# Patient Record
Sex: FEMALE | Race: BLACK OR AFRICAN AMERICAN | Employment: FULL TIME | URBAN - METROPOLITAN AREA
[De-identification: names, ages, dates, MRNs, and addresses within clinical notes are randomized per-mention and may not be internally consistent; named-entity substitution may affect disease eponyms.]

---

## 2018-05-19 LAB
ANTIBODY SCREEN, EXTERNAL: NEGATIVE
HBSAG, EXTERNAL: NEGATIVE
HIV, EXTERNAL: NON REACTIVE
RUBELLA, EXTERNAL: NORMAL
T. PALLIDUM, EXTERNAL: NEGATIVE
TYPE, ABO & RH, EXTERNAL: NORMAL

## 2018-11-27 LAB — GRBS, EXTERNAL: NEGATIVE

## 2018-12-30 ENCOUNTER — HOSPITAL ENCOUNTER (INPATIENT)
Age: 38
LOS: 2 days | Discharge: HOME | End: 2019-01-01
Attending: OBSTETRICS & GYNECOLOGY | Admitting: OBSTETRICS & GYNECOLOGY
Payer: COMMERCIAL

## 2018-12-30 ENCOUNTER — ANESTHESIA (OUTPATIENT)
Dept: LABOR AND DELIVERY | Age: 38
End: 2018-12-30
Payer: COMMERCIAL

## 2018-12-30 ENCOUNTER — ANESTHESIA EVENT (OUTPATIENT)
Dept: LABOR AND DELIVERY | Age: 38
End: 2018-12-30
Payer: COMMERCIAL

## 2018-12-30 PROBLEM — O42.90 AMNIOTIC FLUID LEAKING: Status: ACTIVE | Noted: 2018-12-30

## 2018-12-30 LAB
ERYTHROCYTE [DISTWIDTH] IN BLOOD BY AUTOMATED COUNT: 13.6 % (ref 11.5–14.5)
HCT VFR BLD AUTO: 36 % (ref 35–47)
HGB BLD-MCNC: 11.5 G/DL (ref 11.5–16)
MCH RBC QN AUTO: 26 PG (ref 26–34)
MCHC RBC AUTO-ENTMCNC: 31.9 G/DL (ref 30–36.5)
MCV RBC AUTO: 81.4 FL (ref 80–99)
NRBC # BLD: 0 K/UL (ref 0–0.01)
NRBC BLD-RTO: 0 PER 100 WBC
PLATELET # BLD AUTO: 252 K/UL (ref 150–400)
PMV BLD AUTO: 11.4 FL (ref 8.9–12.9)
RBC # BLD AUTO: 4.42 M/UL (ref 3.8–5.2)
WBC # BLD AUTO: 8.9 K/UL (ref 3.6–11)

## 2018-12-30 PROCEDURE — 74011250637 HC RX REV CODE- 250/637: Performed by: ADVANCED PRACTICE MIDWIFE

## 2018-12-30 PROCEDURE — 36415 COLL VENOUS BLD VENIPUNCTURE: CPT

## 2018-12-30 PROCEDURE — 77030014125 HC TY EPDRL BBMI -B: Performed by: ANESTHESIOLOGY

## 2018-12-30 PROCEDURE — 77030011943

## 2018-12-30 PROCEDURE — 85027 COMPLETE CBC AUTOMATED: CPT

## 2018-12-30 PROCEDURE — 0KQM0ZZ REPAIR PERINEUM MUSCLE, OPEN APPROACH: ICD-10-PCS | Performed by: ADVANCED PRACTICE MIDWIFE

## 2018-12-30 PROCEDURE — 75410000000 HC DELIVERY VAGINAL/SINGLE

## 2018-12-30 PROCEDURE — 94760 N-INVAS EAR/PLS OXIMETRY 1: CPT

## 2018-12-30 PROCEDURE — 74011250636 HC RX REV CODE- 250/636: Performed by: ANESTHESIOLOGY

## 2018-12-30 PROCEDURE — A4300 CATH IMPL VASC ACCESS PORTAL: HCPCS

## 2018-12-30 PROCEDURE — 74011250637 HC RX REV CODE- 250/637: Performed by: OBSTETRICS & GYNECOLOGY

## 2018-12-30 PROCEDURE — 74011250636 HC RX REV CODE- 250/636: Performed by: ADVANCED PRACTICE MIDWIFE

## 2018-12-30 PROCEDURE — 76060000078 HC EPIDURAL ANESTHESIA

## 2018-12-30 PROCEDURE — 65410000002 HC RM PRIVATE OB

## 2018-12-30 PROCEDURE — 75410000003 HC RECOV DEL/VAG/CSECN EA 0.5 HR

## 2018-12-30 PROCEDURE — 75410000002 HC LABOR FEE PER 1 HR

## 2018-12-30 PROCEDURE — 74011000250 HC RX REV CODE- 250

## 2018-12-30 RX ORDER — SIMETHICONE 80 MG
80 TABLET,CHEWABLE ORAL
Status: DISCONTINUED | OUTPATIENT
Start: 2018-12-30 | End: 2019-01-01 | Stop reason: HOSPADM

## 2018-12-30 RX ORDER — FENTANYL CITRATE 50 UG/ML
100 INJECTION, SOLUTION INTRAMUSCULAR; INTRAVENOUS ONCE
Status: DISCONTINUED | OUTPATIENT
Start: 2018-12-30 | End: 2018-12-30 | Stop reason: HOSPADM

## 2018-12-30 RX ORDER — OXYTOCIN/0.9 % SODIUM CHLORIDE 30/500 ML
0-25 PLASTIC BAG, INJECTION (ML) INTRAVENOUS
Status: DISCONTINUED | OUTPATIENT
Start: 2018-12-30 | End: 2018-12-30 | Stop reason: HOSPADM

## 2018-12-30 RX ORDER — FENTANYL CITRATE 50 UG/ML
INJECTION, SOLUTION INTRAMUSCULAR; INTRAVENOUS AS NEEDED
Status: DISCONTINUED | OUTPATIENT
Start: 2018-12-30 | End: 2018-12-30 | Stop reason: HOSPADM

## 2018-12-30 RX ORDER — SODIUM CHLORIDE 0.9 % (FLUSH) 0.9 %
SYRINGE (ML) INJECTION
Status: DISPENSED
Start: 2018-12-30 | End: 2018-12-30

## 2018-12-30 RX ORDER — SODIUM CHLORIDE 0.9 % (FLUSH) 0.9 %
SYRINGE (ML) INJECTION
Status: DISPENSED
Start: 2018-12-30 | End: 2018-12-31

## 2018-12-30 RX ORDER — HYDROCORTISONE ACETATE PRAMOXINE HCL 2.5; 1 G/100G; G/100G
CREAM TOPICAL AS NEEDED
Status: DISCONTINUED | OUTPATIENT
Start: 2018-12-30 | End: 2019-01-01 | Stop reason: HOSPADM

## 2018-12-30 RX ORDER — HYDROCODONE BITARTRATE AND ACETAMINOPHEN 5; 325 MG/1; MG/1
1 TABLET ORAL
Status: DISCONTINUED | OUTPATIENT
Start: 2018-12-30 | End: 2019-01-01 | Stop reason: HOSPADM

## 2018-12-30 RX ORDER — BUPIVACAINE HYDROCHLORIDE 5 MG/ML
30 INJECTION, SOLUTION EPIDURAL; INTRACAUDAL ONCE
Status: DISCONTINUED | OUTPATIENT
Start: 2018-12-30 | End: 2018-12-30 | Stop reason: HOSPADM

## 2018-12-30 RX ORDER — CALCIUM CARBONATE 200(500)MG
1 TABLET,CHEWABLE ORAL DAILY
COMMUNITY

## 2018-12-30 RX ORDER — MAG HYDROX/ALUMINUM HYD/SIMETH 200-200-20
30 SUSPENSION, ORAL (FINAL DOSE FORM) ORAL
Status: DISCONTINUED | OUTPATIENT
Start: 2018-12-30 | End: 2019-01-01 | Stop reason: HOSPADM

## 2018-12-30 RX ORDER — NALBUPHINE HYDROCHLORIDE 10 MG/ML
10 INJECTION, SOLUTION INTRAMUSCULAR; INTRAVENOUS; SUBCUTANEOUS
Status: DISCONTINUED | OUTPATIENT
Start: 2018-12-30 | End: 2018-12-30 | Stop reason: HOSPADM

## 2018-12-30 RX ORDER — NALOXONE HYDROCHLORIDE 0.4 MG/ML
0.4 INJECTION, SOLUTION INTRAMUSCULAR; INTRAVENOUS; SUBCUTANEOUS AS NEEDED
Status: DISCONTINUED | OUTPATIENT
Start: 2018-12-30 | End: 2018-12-30 | Stop reason: HOSPADM

## 2018-12-30 RX ORDER — NALBUPHINE HYDROCHLORIDE 10 MG/ML
2.5 INJECTION, SOLUTION INTRAMUSCULAR; INTRAVENOUS; SUBCUTANEOUS
Status: DISCONTINUED | OUTPATIENT
Start: 2018-12-30 | End: 2018-12-30 | Stop reason: HOSPADM

## 2018-12-30 RX ORDER — LIDOCAINE HYDROCHLORIDE AND EPINEPHRINE 15; 5 MG/ML; UG/ML
INJECTION, SOLUTION EPIDURAL AS NEEDED
Status: DISCONTINUED | OUTPATIENT
Start: 2018-12-30 | End: 2018-12-30 | Stop reason: HOSPADM

## 2018-12-30 RX ORDER — CALCIUM CARBONATE 200(500)MG
200 TABLET,CHEWABLE ORAL DAILY
Status: DISCONTINUED | OUTPATIENT
Start: 2018-12-31 | End: 2019-01-01 | Stop reason: HOSPADM

## 2018-12-30 RX ORDER — FENTANYL/BUPIVACAINE/NS/PF 2-1250MCG
1-16 PREFILLED PUMP RESERVOIR EPIDURAL CONTINUOUS
Status: DISCONTINUED | OUTPATIENT
Start: 2018-12-30 | End: 2019-01-01 | Stop reason: HOSPADM

## 2018-12-30 RX ORDER — FAMOTIDINE 10 MG/1
10 TABLET ORAL 2 TIMES DAILY
COMMUNITY

## 2018-12-30 RX ORDER — TERBUTALINE SULFATE 1 MG/ML
0.25 INJECTION SUBCUTANEOUS AS NEEDED
Status: DISCONTINUED | OUTPATIENT
Start: 2018-12-30 | End: 2018-12-30 | Stop reason: HOSPADM

## 2018-12-30 RX ORDER — IBUPROFEN 400 MG/1
800 TABLET ORAL EVERY 8 HOURS
Status: DISCONTINUED | OUTPATIENT
Start: 2018-12-30 | End: 2019-01-01 | Stop reason: HOSPADM

## 2018-12-30 RX ORDER — OXYTOCIN/RINGER'S LACTATE 20/1000 ML
125-500 PLASTIC BAG, INJECTION (ML) INTRAVENOUS ONCE
Status: ACTIVE | OUTPATIENT
Start: 2018-12-30 | End: 2018-12-31

## 2018-12-30 RX ORDER — FAMOTIDINE 20 MG/1
10 TABLET, FILM COATED ORAL 2 TIMES DAILY
Status: DISCONTINUED | OUTPATIENT
Start: 2018-12-30 | End: 2019-01-01 | Stop reason: HOSPADM

## 2018-12-30 RX ORDER — SODIUM CHLORIDE 0.9 % (FLUSH) 0.9 %
SYRINGE (ML) INJECTION
Status: COMPLETED
Start: 2018-12-30 | End: 2018-12-30

## 2018-12-30 RX ORDER — SWAB
1 SWAB, NON-MEDICATED MISCELLANEOUS DAILY
Status: DISCONTINUED | OUTPATIENT
Start: 2018-12-31 | End: 2019-01-01 | Stop reason: HOSPADM

## 2018-12-30 RX ORDER — BUPIVACAINE HYDROCHLORIDE 5 MG/ML
INJECTION, SOLUTION EPIDURAL; INTRACAUDAL AS NEEDED
Status: DISCONTINUED | OUTPATIENT
Start: 2018-12-30 | End: 2018-12-30 | Stop reason: HOSPADM

## 2018-12-30 RX ORDER — NALOXONE HYDROCHLORIDE 0.4 MG/ML
0.4 INJECTION, SOLUTION INTRAMUSCULAR; INTRAVENOUS; SUBCUTANEOUS AS NEEDED
Status: DISCONTINUED | OUTPATIENT
Start: 2018-12-30 | End: 2019-01-01 | Stop reason: HOSPADM

## 2018-12-30 RX ORDER — EPHEDRINE SULFATE 50 MG/ML
10 INJECTION, SOLUTION INTRAVENOUS
Status: DISCONTINUED | OUTPATIENT
Start: 2018-12-30 | End: 2018-12-30 | Stop reason: HOSPADM

## 2018-12-30 RX ORDER — ACETAMINOPHEN 325 MG/1
650 TABLET ORAL
Status: DISCONTINUED | OUTPATIENT
Start: 2018-12-30 | End: 2019-01-01 | Stop reason: HOSPADM

## 2018-12-30 RX ORDER — FENTANYL CITRATE 50 UG/ML
100 INJECTION, SOLUTION INTRAMUSCULAR; INTRAVENOUS
Status: DISCONTINUED | OUTPATIENT
Start: 2018-12-30 | End: 2018-12-30 | Stop reason: HOSPADM

## 2018-12-30 RX ORDER — SODIUM CHLORIDE, SODIUM LACTATE, POTASSIUM CHLORIDE, CALCIUM CHLORIDE 600; 310; 30; 20 MG/100ML; MG/100ML; MG/100ML; MG/100ML
125 INJECTION, SOLUTION INTRAVENOUS CONTINUOUS
Status: DISCONTINUED | OUTPATIENT
Start: 2018-12-30 | End: 2019-01-01 | Stop reason: HOSPADM

## 2018-12-30 RX ADMIN — FENTANYL CITRATE 100 MCG: 50 INJECTION, SOLUTION INTRAMUSCULAR; INTRAVENOUS at 09:06

## 2018-12-30 RX ADMIN — IBUPROFEN 800 MG: 400 TABLET, FILM COATED ORAL at 19:33

## 2018-12-30 RX ADMIN — OXYTOCIN-SODIUM CHLORIDE 0.9% IV SOLN 30 UNIT/500ML 4 MILLI-UNITS/MIN: 30-0.9/5 SOLUTION at 11:47

## 2018-12-30 RX ADMIN — BUPIVACAINE HYDROCHLORIDE 2 ML: 5 INJECTION, SOLUTION EPIDURAL; INTRACAUDAL at 09:05

## 2018-12-30 RX ADMIN — LIDOCAINE HYDROCHLORIDE AND EPINEPHRINE 5 ML: 15; 5 INJECTION, SOLUTION EPIDURAL at 09:03

## 2018-12-30 RX ADMIN — Medication 10 ML: at 08:34

## 2018-12-30 RX ADMIN — Medication 10 ML/HR: at 09:11

## 2018-12-30 RX ADMIN — SODIUM CHLORIDE, SODIUM LACTATE, POTASSIUM CHLORIDE, AND CALCIUM CHLORIDE 1000 ML: 600; 310; 30; 20 INJECTION, SOLUTION INTRAVENOUS at 08:37

## 2018-12-30 RX ADMIN — OXYTOCIN-SODIUM CHLORIDE 0.9% IV SOLN 30 UNIT/500ML 2 MILLI-UNITS/MIN: 30-0.9/5 SOLUTION at 10:57

## 2018-12-30 RX ADMIN — BUPIVACAINE HYDROCHLORIDE 3 ML: 5 INJECTION, SOLUTION EPIDURAL; INTRACAUDAL at 09:07

## 2018-12-30 RX ADMIN — ALUMINUM HYDROXIDE, MAGNESIUM HYDROXIDE, AND SIMETHICONE 30 ML: 200; 200; 20 SUSPENSION ORAL at 16:19

## 2018-12-30 NOTE — H&P
Labor and Delivery Admission Note 2018 Patient is a 41 y.o.  40w4d by LMP. Pt of Dr. Nguyen Arango. Presents today with c/o SROM at [address], clear fluid. Feeling UCs but mildly. Plans on an epidural when pain increases. Denies sig med/ob hx. Uncomplicated prenatal course. FOB at bedside. No complaints. PNC: Blood type: O 
          RH: pos Hep B: Negative Rubella: immune GBS status: Negative OBHX:  
OB History  Para Term  AB Living 2       1 0  
 SAB TAB Ectopic Molar Multiple Live Births PMH:  
Past Medical History:  
Diagnosis Date  Abnormal Papanicolaou smear of cervix --abnormal, colposcopy PSH: No past surgical history on file. OB/GYN: Mariely Mcnulty Meds:  
Prior to Admission Medications Prescriptions Last Dose Informant Patient Reported? Taking? PNV66-Iron Fumarate-FA-DSS-DHA 26-1.2- mg cap 2018 at Unknown time  Yes Yes Sig: Take  by mouth.  
calcium carbonate (TUMS) 200 mg calcium (500 mg) chew   Yes Yes Sig: Take 1 Tab by mouth daily. famotidine (PEPCID AC) 10 mg tablet 2018 at Unknown time  Yes Yes Sig: Take 10 mg by mouth two (2) times a day. Facility-Administered Medications: None Allergies: Allergies Allergen Reactions  Bactrim [Sulfamethoprim] Shortness of Breath  Sulfa (Sulfonamide Antibiotics) Shortness of Breath Pertinent ROS: Review of Systems - History obtained from chart review and the patient General ROS: negative Psychological ROS: negative Respiratory ROS: no cough, shortness of breath, or wheezing Cardiovascular ROS: no chest pain or dyspnea on exertion Genito-Urinary ROS: no dysuria, trouble voiding, or hematuria Neurological ROS: no TIA or stroke symptoms [address]: No family history on file. SH: Social History Socioeconomic History  Marital status:  Spouse name: Not on file  Number of children: Not on file  Years of education: Not on file  Highest education level: Not on file Social Needs  Financial resource strain: Not on file  Food insecurity - worry: Not on file  Food insecurity - inability: Not on file  Transportation needs - medical: Not on file  Transportation needs - non-medical: Not on file Occupational History  Not on file Tobacco Use  Smoking status: Never Smoker  Smokeless tobacco: Never Used Substance and Sexual Activity  Alcohol use: No  
  Frequency: Never  Drug use: No  
 Sexual activity: Yes  
  Partners: Male Other Topics Concern [address] Service Not Asked  Blood Transfusions Not Asked  Caffeine Concern Not Asked  Occupational Exposure Not Asked [employer] Hazards Not Asked  Sleep Concern Not Asked  Stress Concern Not Asked  Weight Concern Not Asked  Special Diet Not Asked  Back Care Not Asked  Exercise Not Asked  Bike Helmet Not Asked  Seat Belt Not Asked  Self-Exams Not Asked Social History Narrative  Not on file OBJECTIVE: 
 
Temp (24hrs), Av.2 °F (36.8 °C), Min:98.2 °F (36.8 °C), Max:98.2 °F (36.8 °C) Visit Vitals /87 Pulse 72 Temp 98.2 °F (36.8 °C) Resp 18 Ht 5' 8\" (1.727 m) Wt 86.5 kg (190 lb 9.6 oz) Breastfeeding? No  
BMI 28.98 kg/m² FHR baseline 130 moderate variaibility, +accels, no decels [place] 4-6 min, mild Exam: 
General: alert HEENT:  normal  
Lungs:  clear Cor:  RRR Abdomen:  Soft, non-tender Clinical EFW 3400 gms Cervix:  3/50/-2 Fluid:  clear Pelvimetry:  AP-good Arch- adequate Sidewalls- adequate Pelvis feels adequate for fetus. Extremities:  normal, no edema Impression:  
 at 40w4d Reassuring FWB 
SROM x3 hrs, afebrile Plan:  
Admit Routine labs, orders Expectant management, plan for pitocin at 12pm if needed Omar Arellano CNM 
3:31 AM

## 2018-12-30 NOTE — ANESTHESIA PROCEDURE NOTES
Epidural Block Performed by: Charleen Underwood DO Authorized by: Charleen Underwood DO  
 
Pre-Procedure Indication: at surgeon's request and labor epidural   
Preanesthetic Checklist: patient identified, risks and benefits discussed, anesthesia consent, site marked, patient being monitored, timeout performed and anesthesia consent Epidural:  
Patient position:  Seated Prep region:  Lumbar Prep: Chlorhexidine Location:  L3-4 Needle and Epidural Catheter:  
Needle Type:  Tuohy Needle Gauge:  17 G Injection Technique:  Loss of resistance using air Attempts:  1 Catheter Size:  19 G Events: no blood with aspiration, no cerebrospinal fluid with aspiration, no paresthesia and negative aspiration test   
Test Dose:  Bupivacaine 0.25% and negative Assessment:  
Catheter Secured:  Tegaderm and tape Insertion:  Uncomplicated Patient tolerance:  Patient tolerated the procedure well with no immediate complications

## 2018-12-30 NOTE — PROGRESS NOTES
[facility] Labor Progress Note Patient: Randi Siegel MRN: [id number]  SSN: [id number] YOB: 1980  Age: 41 y.o. Sex: female Subjective:  
Patient coping well with contractions. Continues to be comfortable with epidural. Reports consistent/strong pressure. Objective:  
Blood pressure 104/58, pulse 75, temperature 98.8 °F (37.1 °C), resp. rate 18, height 5' 8\" (1.727 m), weight 86.5 kg (190 lb 9.6 oz), SpO2 97 %, not currently breastfeeding. Fetal heart baseline 130, moderate variability, positive accelerations, negative decelerations, Uterine contractions q 2 minutes, strong to palpation, resting tone soft, Sterile Vaginal Exam: 10 cm dilated/ 100 % effaced/ +2 station, fetal presentation vertex, membranes ruptured for continued clear fluid. Pitocin at 16 mu/min Patient Vitals for the past 4 hrs: 
 Temp Pulse BP  
18 1505  75 104/58  
18 1405  86 113/65  
18 1253 98.8 °F (37.1 °C) 82 110/58 Assessment:  
 
40w4d Category 1 fetal heart rate tracing Labor- Starting second stage SROM x 16 hours, no s/s of infection Plan:  
Start pushing Anticipate  Herberth Mistry CNM

## 2018-12-30 NOTE — ANESTHESIA PREPROCEDURE EVALUATION
Anesthetic History No history of anesthetic complications Review of Systems / Medical History Patient summary reviewed, nursing notes reviewed and pertinent labs reviewed Pulmonary Within defined limits Neuro/Psych Within defined limits Cardiovascular Within defined limits Exercise tolerance: >4 METS 
  
GI/Hepatic/Renal 
Within defined limits Endo/Other Within defined limits Other Findings Comments: Labor pain Physical Exam 
 
Airway Mallampati: II 
TM Distance: 4 - 6 cm Neck ROM: normal range of motion Mouth opening: Normal 
 
 Cardiovascular Regular rate and rhythm,  S1 and S2 normal,  no murmur, click, rub, or gallop Rhythm: regular Rate: normal 
 
 
 
 Dental 
No notable dental hx Pulmonary Breath sounds clear to auscultation Abdominal 
GI exam deferred Other Findings Anesthetic Plan ASA: 2 Anesthesia type: epidural 
 
 
 
 
 
Anesthetic plan and risks discussed with: Patient and Spouse

## 2018-12-30 NOTE — PROGRESS NOTES
[facility] Labor Progress Note Patient: Jacob Nava MRN: [id number]  SSN: [id number] YOB: 1980  Age: 41 y.o. Sex: female Subjective:  
Patient coping well with contractions. Is very comfortable with epidural. Has been able to get some sleep. Objective:  
Blood pressure 115/69, pulse 91, temperature 98.6 °F (37 °C), resp. rate 18, height 5' 8\" (1.727 m), weight 86.5 kg (190 lb 9.6 oz), SpO2 97 %, not currently breastfeeding. Fetal heart baseline 120, moderate variability, positive accelerations, positive early decelerations, Uterine contractions q 6-8 minutes, moderate to palpation, resting tone soft, Sterile Vaginal Exam: 4 cm dilated/ 100 % effaced/ -3 station, cervix midline, fetal presentation vertex, membranes ruptured for continued clear fluid Patient Vitals for the past 4 hrs: 
 Temp Pulse Resp BP SpO2  
18 0931  91  115/69   
18 0926  82  119/68   
18 0921  91  111/59   
18 0917     97 % 18 0915  84  102/55   
18 0912     98 % 18 0910  85  104/59   
18 0904  93  130/82   
18 0901  81  142/88   
18 0900    132/81   
18 0850  74  (!) 142/91   
18 0833  77  127/84   
18 0743 98.6 °F (37 °C) 73 18 132/82  Assessment:  
 
40w4d Category 1 fetal heart rate tracing Early Labor- Some progress noted SROM x 10 hours Plan:  
Discussed cervical change and spaced contraction pattern. Discussed augmentation with pitocin at this time to facilitate cervical change. Client verbalized understanding and agreement to augment at this time. Order to pitocin placed. Titrate to adequacy. Maternal and fetal monitoring per protocol. Anticipate  Latisha Matias CNM

## 2018-12-30 NOTE — PROGRESS NOTES
[id number] Report received from [place] [id number] off the monitor encouraged to get up and ambulate 0740 Bedside shift change report given to LARON Scott Rn (oncoming nurse) by Venus Sheridan (offgoing nurse). Report included the following information SBAR, MAR and Med Rec Status.

## 2018-12-30 NOTE — PROGRESS NOTES
[id number] called sue to come for epidural  
 
[address] [facility] [address]. Damir Velazquez in for epidural

## 2018-12-30 NOTE — ANESTHESIA POSTPROCEDURE EVALUATION
Labor epidural. 
 
Anesthesia Post Evaluation Multimodal analgesia: multimodal analgesia used between 6 hours prior to anesthesia start to PACU discharge Patient location during evaluation: bedside Patient participation: complete - patient participated Level of consciousness: awake and alert Pain management: adequate Airway patency: patent Anesthetic complications: no 
Cardiovascular status: acceptable Respiratory status: acceptable Hydration status: acceptable Post anesthesia nausea and vomiting:  none Visit Vitals /67 (BP 1 Location: Left arm) Pulse 97 Temp 37.1 °C (98.8 °F) Resp 18 Ht 5' 8\" (1.727 m) Wt 86.5 kg (190 lb 9.6 oz) SpO2 97% Breastfeeding? Unknown BMI 28.98 kg/m²

## 2018-12-30 NOTE — PROGRESS NOTES
[facility] Labor Progress Note Patient: Miles Guerrero MRN: [id number]  SSN: [id number] YOB: 1980  Age: 41 y.o. Sex: female Subjective:  
Patient standing at side of bed, tearful, states contractions are much more intense. Reports the pain is more in abdomen, not so much in back now. Would like epidural at this time. Objective:  
Blood pressure 132/82, pulse 73, temperature 98.6 °F (37 °C), resp. rate 18, height 5' 8\" (1.727 m), weight 86.5 kg (190 lb 9.6 oz), not currently breastfeeding. Fetal heart baseline 140, moderate variability, positive accelerations, negative decelerations, Uterine contractions more consistent, strong to palpation, resting tone soft, Sterile Vaginal Exam: deferred, fetal presentation vertex, membranes ruptured for continued clear fluid. Patient Vitals for the past 4 hrs: 
 Temp Pulse Resp BP  
18 0743 98.6 °F (37 °C) 73 18 132/82  
18 0611  75  131/76  
18 0433 98.2 °F (36.8 °C) (!) 57 16 133/79 Assessment:  
 
40w4d Category 1 fetal heart rate tracing Labor SROM x 8 hours GBS Negative Rubella Immune Hep B negative O Positive Plan:  
Start bolus for epidural.  
Recheck cervix with next straight cath, or sooner with maternal or fetal indication Anticipate  Nolan Rojas CNM

## 2018-12-30 NOTE — L&D DELIVERY NOTE
CNM Delivery Note       Patient: Jacob Nava MRN: [id number]  SSN: [id number]    YOB: 1980  Age: 41 y.o. Sex: female        Complete cervical dilation at 46.  of a live female infant at 46 with Apgars 9, 9 in DANIEL position under epidural anesthesia with mother in [address] position. Shoulders spontaneous, easily delivered with maternal effort. Vigorous infant placed on maternal abdomen immediately following delivery. Placenta and membranes spontaneous, intact, with 3 vessel cord via Jory Human mechanism at [phone]. Fundus massaged to firm. Estimated blood loss 250 mL. Vagina and perineum inspected. 2nd degree perineal laceration repaired with 2-0 vicryl under epidural anesthesia. Mother and infant stable, bonding, establishing breastfeeding. Delivery Summary    Patient: Jacob Nava MRN: [id number]  SSN: [id number]    YOB: 1980  Age: 41 y.o.   Sex: female       Information for the patient's :  Asia Schreiber [[id number]]       Labor Events:    Labor: No   Rupture Date: 2018   Rupture Time: 12:00 AM   Rupture Type SROM   Amniotic Fluid Volume:      Amniotic Fluid Description: Clear     Induction:         Augmentation: Oxytocin   Labor Events: None     Cervical Ripening:     None     Delivery Events:  Episiotomy: None   Laceration(s): Second degree perineal     Repaired: Yes    Number of Repair Packets: 1   Suture Type and Size:       Estimated Blood Loss (ml): 250ml       Delivery Date: 2018    Delivery Time: 4:49 PM  Delivery Type: Vaginal, Spontaneous  Sex:  Female     Gestational Age: 36w2d   Delivery Clinician:  Latisha Matias  Living Status: Living   Delivery Location: L&D  Room 5          APGARS  One minute Five minutes Ten minutes   Skin color: 1   1        Heart rate: 2   2        Grimace: 2   2        Muscle tone: 2   2        Breathin   2        Totals: 9   9            Presentation: Vertex    Position: Left Occiput Anterior  Resuscitation Method:  Suctioning-bulb; Tactile Stimulation     Meconium Stained: Other (Comment) terminal    Cord Information: 3 Vessels  Complications: None  Cord around: left lower extremity  Delayed cord clamping? Yes  Cord clamped date/time:   Disposition of Cord Blood: Lab    Blood Gases Sent?: No    Placenta:  Date/Time: 2018  4:55 PM  Removal: Spontaneous      Appearance: Normal      Measurements:  Birth Weight:        Birth Length:        Head Circumference:        Chest Circumference:       Abdominal Girth: Other Providers:   Maddy GARCIA;;;;;;;;, Obstetrician;Primary Nurse;Primary  Nurse;Nicu Nurse;Neonatologist;Anesthesiologist;Crna;Nurse Practitioner;Midwife;Nursery Nurse           Group B Strep:   Lab Results   Component Value Date/Time    GrBStrep, External NEGATIVE 2018     Information for the patient's :  Pau Hernandez [[id number]]   No results found for: ABORH, PCTABR, PCTDIG, BILI, ABORHEXT, ABORH    No results for input(s): PCO2CB, PO2CB, HCO3I, SO2I, IBD, PTEMPI, SPECTI, PHICB, ISITE, IDEV, IALLEN in the last 72 hours.

## 2018-12-30 NOTE — PROGRESS NOTES
[facility] Labor Progress Note Patient: Miles Guerrero MRN: [id number]  SSN: [id number] YOB: 1980  Age: 41 y.o. Sex: female Subjective:  
Patient coping well with contractions. Remains very comfortable with epidural.  
 
 
Objective:  
Blood pressure 110/58, pulse 82, temperature 98.8 °F (37.1 °C), resp. rate 18, height 5' 8\" (1.727 m), weight 86.5 kg (190 lb 9.6 oz), SpO2 97 %, not currently breastfeeding. Fetal heart baseline 130, moderate variability, positive accelerations,  negative decelerations, Uterine contractions q 2-4 minutes, strong to palpation, resting tone soft, Sterile Vaginal Exam: 7 cm dilated/ 100 % effaced/ -1 station, cervix anterior, fetal presentation vertex, membranes ruptured for continued clear fluid. Pitocin at 10 mu/min Patient Vitals for the past 4 hrs: 
 Temp Pulse Resp BP  
18 1253 98.8 °F (37.1 °C) 82  110/58  
18 1100 98.2 °F (36.8 °C) 74 18 116/69 Assessment:  
 
40w4d Category 1 fetal heart rate tracing Labor- Active Phase SROM x 14 hours Plan:  
Continue current orders/management Continue to titrate pitocin to adequacy. Anticipate  Nolan Rojas CNM

## 2018-12-31 PROCEDURE — 65410000002 HC RM PRIVATE OB

## 2018-12-31 PROCEDURE — 74011250637 HC RX REV CODE- 250/637: Performed by: ADVANCED PRACTICE MIDWIFE

## 2018-12-31 RX ADMIN — Medication 1 TABLET: at 11:01

## 2018-12-31 RX ADMIN — IBUPROFEN 800 MG: 400 TABLET, FILM COATED ORAL at 07:59

## 2018-12-31 RX ADMIN — IBUPROFEN 800 MG: 400 TABLET, FILM COATED ORAL at 16:10

## 2018-12-31 NOTE — LACTATION NOTE
This note was copied from a baby's chart. Initial Lactation Consultation  - Baby born vaginally yesterday afternoon to a  mom at 40 4/7 weeks gestation. Mom says she has been able to get the baby latched on the right breast but is having some difficulty on the left breast. Mom said the baby had been latching, popping off the breast and then latching again. Baby had a bath a little while ago and has been sleepy this afternoon. I helped mom get the baby positioned next to her in the football hold. Baby was sucking her tongue but we did get her to latch a couple times. She did not have a deep latch and the baby kept falling asleep. We switched to the right breast and baby latched on her own. She did not have a wide mouth so I showed mom how to pull baby's chin down. She nursed for about 2 minutes and then fell asleep. I showed mom hand expression and she was able to get 10 drops from each breast and put it in the baby's mouth. Mom will continue to offer the breast at least every 2 hours. She will hand express and offer drops of colostrum if baby doesn't latch and nurse well.

## 2018-12-31 NOTE — PROGRESS NOTES
Post-Partum Day Number 1 Progress Note Patient doing well post-partum without significant complaint. Voiding withour difficulty, normal lochia. Vitals:   
Patient Vitals for the past 8 hrs: 
 BP Temp Pulse Resp  
18 0840 121/74 97.9 °F (36.6 °C) 87 18 Temp (24hrs), Av.5 °F (36.9 °C), Min:97.9 °F (36.6 °C), Max:98.8 °F (37.1 °C) Vital signs stable, afebrile. Exam:  Patient without distress. Abdomen soft, fundus firm at level of umbilicus, nontender Perineum with normal lochia noted. Lower extremities are negative for swelling, cords or tenderness. Lab/Data Review: No labs Assessment and Plan:  Patient appears to be having uncomplicated post-partum course. Continue routine perineal care and maternal education. Plan discharge tomorrow if no problems occur. Girl/benign labs

## 2018-12-31 NOTE — ROUTINE PROCESS
OB SBAR received from [facility]) RN and care assumed. 2110: Patient up with assistance to restroom, tolerated well. Voided large amount. Due to check void by 0315.

## 2019-01-01 VITALS
TEMPERATURE: 98.4 F | WEIGHT: 190.6 LBS | BODY MASS INDEX: 28.89 KG/M2 | HEART RATE: 83 BPM | RESPIRATION RATE: 16 BRPM | SYSTOLIC BLOOD PRESSURE: 122 MMHG | HEIGHT: 68 IN | OXYGEN SATURATION: 97 % | DIASTOLIC BLOOD PRESSURE: 81 MMHG

## 2019-01-01 PROCEDURE — 74011250637 HC RX REV CODE- 250/637: Performed by: ADVANCED PRACTICE MIDWIFE

## 2019-01-01 RX ADMIN — IBUPROFEN 800 MG: 400 TABLET, FILM COATED ORAL at 00:32

## 2019-01-01 NOTE — PROGRESS NOTES
Post-Partum Day Number 2 Progress/Discharge Note Patient doing well post-partum without significant complaint. Voiding without difficulty, normal lochia. Denies cp/sob/n/v. Declines pain med script on discharge. Vitals:   
Patient Vitals for the past 8 hrs: 
 BP Temp Pulse Resp  
19 0815 122/81 98.4 °F (36.9 °C) 83 16  
19 0303 123/80 98.3 °F (36.8 °C) 87 15 Temp (24hrs), Av.3 °F (36.8 °C), Min:98.2 °F (36.8 °C), Max:98.6 °F (37 °C) Vital signs stable, afebrile. Exam:  Patient without distress. Heart regular rate and rhythm Lungs CTA b/l Abdomen soft, fundus firm at level of umbilicus, non tender Lower extremities are negative for swelling, cords or tenderness. Lab/Data Review: 
no new labs Assessment and Plan:  Patient appears to be having uncomplicated post-partum course. Continue routine perineal care and maternal education. Plan discharge for today with follow up in our office in 6 weeks. Girl. Benign labs.

## 2019-01-01 NOTE — DISCHARGE INSTRUCTIONS
POSTPARTUM DISCHARGE INSTRUCTIONS       Name:  Troy Barbosa  YOB: 1980  Admission Diagnosis:  Pregnancy  Amniotic fluid leaking     Discharge Diagnosis:    Problem List as of 1/1/2019 Never Reviewed          Codes Class Noted - Resolved    Amniotic fluid leaking ICD-10-CM: O42.90  ICD-9-CM: 658.10  12/30/2018 - Present            Attending Physician:  Chris Katz, DO    Delivery Type:  Vaginal Childbirth with Episiotomy, Laceration or Tear: What To Expect At [address] [place] [address] body will slowly heal in the next few weeks. It is easy to get too tired and overwhelmed during the first weeks after your baby is born. Changes in your hormones can shift your mood without warning. You may find it hard to meet the extra demands on your energy and time. Take it easy on yourself. Follow-up care is a key part of your treatment and safety. Be sure to make and go to all appointments, and call your doctor if you are having problems. It's also a good idea to know your test results and keep a list of the medicines you take. How can you care for yourself at home? Vaginal Bleeding and Cramps  · After delivery, you will have a bloody discharge from the vagina. This will turn pink within a week and then white or yellow after about 10 days. It may last for 2 to 4 weeks or longer, until the uterus has healed. Use pads instead of tampons until you stop bleeding. · Do not worry if you pass some blood clots, as long as they are smaller than a golf ball. If you have a tear or stitches in your vaginal area, change the pad at least every 4 hours to prevent soreness and infection. · You may have cramps for the first few days after childbirth. These are normal and occur as the uterus shrinks to normal size. Take an over-the-counter pain medicine, such as acetaminophen (Tylenol), ibuprofen (Advil, Motrin), or naproxen (Aleve), for cramps. Read and follow all instructions on the label.  Do not take aspirin, because it can cause more bleeding. Do not take acetaminophen (Tylenol) and other acetaminophen containing medications (i.e. Percocet) at the same time. Episiotomy, Lacerations or Tears  · If you have stitches, they will dissolve on their own and do not need to be removed. · Put ice or a cold pack on your painful area for 10 to 20 minutes at a time, several times a day, for the first few days. Put a thin cloth between the ice and your skin. · Sit in a few inches of warm water (sitz bath) 3 times a day and after bowel movements. The warm water helps with pain and itching. If you do not have a tub, a warm shower might help. Breast fullness  · Your breasts may overfill (engorge) in the first few days after delivery. To help milk flow and to relieve pain, warm your breasts in the shower or by using warm, moist towels before nursing. · If you are not nursing, do not put warmth on your breasts or touch your breasts. Wear a tight bra or sports bra and use ice until the fullness goes away. This usually takes 2 to 3 days. · Put ice or a cold pack on your breast after nursing to reduce swelling and pain. Put a thin cloth between the ice and your skin. Activity  · Eat a balanced diet. Do not try to lose weight by cutting calories. Keep taking your prenatal vitamins, or take a multivitamin. · Get as much rest as you can. Try to take naps when your baby sleeps during the day. · Get some exercise every day. But do not do any heavy exercise until your doctor says it is okay. · Wait until you are healed (about 4 to 6 weeks) before you have sexual intercourse. Your doctor will tell you when it is okay to have sex. · Talk to your doctor about birth control. You can get pregnant even before your period returns. Also, you can get pregnant while you are breast-feeding. Mental Health  · Many women get the \"baby blues\" during the first few days after childbirth. You may lose sleep, feel irritable, and cry easily. You may feel happy one minute and sad the next. Hormone changes are one cause of these emotional changes. Also, the demands of a new baby, along with visits from relatives or other family needs, add to a mother's stress. The \"baby blues\" often peak around the fourth day. Then they ease up in less than 2 weeks. · If your moodiness or anxiety lasts for more than 2 weeks, or if you feel like life is not worth living, you may have postpartum depression. This is different for each mother. Some mothers with serious depression may worry intensely about their infant's well-being. Others may feel distant from their child. Some mothers might even feel that they might harm their baby. A mother may have signs of paranoia, wondering if someone is watching her. · With all the changes in your life, you may not know if you are depressed. Pregnancy sometimes causes changes in how you feel that are similar to the symptoms of depression. · Symptoms of depression include:  · Feeling sad or hopeless and losing interest in daily activities. These are the most common symptoms of depression. · Sleeping too much or not enough. · Feeling tired. You may feel as if you have no energy. · Eating too much or too little. · [employer] ([employer]I) offers a Warm line; Chat with the Expert phone sessions; Information and Articles about Pregnancy and Postpartum Mood Disorders; Comprehensive List of Free Support Groups; Knowledgeable local coordinators who will offer support, information, and resources; Guide to Resources on [employer] Calendar of events in the  mood disorders community; Latest News and Research; and [facility] [address] for [employer]. Remember - You are not alone; You are not to blame; With help, you will be well. 7-688-383-PPD(6913). WWW. POSTPARTUM. NET    · Writing or talking about death, such as writing suicide notes or talking about guns, knives, or pills.  Keep the numbers for these national suicide hotlines: 8-501-547-TALK (3-440.635.8470) and 9-712-ZJORHJA (9-290.553.6560). If you or someone you know talks about suicide or feeling hopeless, get help right away. Constipation and Hemorrhoids  Drink plenty of fluids, enough so that your urine is light yellow or clear like water. If you have kidney, heart, or liver disease and have to limit fluids, talk with your doctor before you increase the amount of fluids you drink. · Eat plenty of fiber each day. Have a bran muffin or bran cereal for breakfast, and try eating a piece of fruit for a mid-afternoon snack. · For painful, itchy hemorrhoids, put ice or a cold pack on the area several times a day for 10 minutes at a time. Follow this by putting a warm compress on the area for another 10 to 20 minutes or by sitting in a shallow, warm bath. When should you call for help? Call 911 anytime you think you may need emergency care. For example, call if:  · You are thinking of hurting yourself, your baby, or anyone else. · You passed out (lost consciousness). · You have symptoms of a blood clot in your lung (called a pulmonary embolism). These may include:  · Sudden chest pain. · Trouble breathing. · Coughing up blood. Call your doctor now or seek immediate medical care if:  · You have severe vaginal bleeding. · You are soaking through a pad each hour for 2 or more hours. · Your vaginal bleeding seems to be getting heavier or is still bright red 4 days after delivery. · You are dizzy or lightheaded, or you feel like you may faint. · You are vomiting or cannot keep fluids down. · You have a fever. · You have new or more belly pain. · You pass tissue (not just blood). · Your vaginal discharge smells bad. · Your belly feels tender or full and hard. · Your breasts are continuously painful or red. · You feel sad, anxious, or hopeless for more than a few days. · You have sudden, severe pain in your belly.   · You have symptoms of a blood clot in your leg (called a deep vein thrombosis), such as:  · Pain in your calf, back of the knee, thigh, or groin. · Redness and swelling in your leg or groin. · You have symptoms of preeclampsia, such as:  · Sudden swelling of your face, hands, or feet. · New vision problems (such as dimness or blurring). · A severe headache. · Your blood pressure is higher than it should be or rises suddenly. · You have new nausea or vomiting. Watch closely for changes in your health, and be sure to contact your doctor if you have any problems. Additional Information:  Postpartum Support    PARENTS:  Are you feeling sad or depressed? Is it difficult for you to enjoy yourself? Do you feel more irritable or tense? Do you feel anxious or panicky? Are you having difficulty bonding with your baby? Do you feel as if you are \"out of control\" or \"going crazy\"? Are you worried that you might hurt your baby or yourself? FAMILIES: Do you worry that something is wrong but don't know how to help? Do you think that your partner or spouse is having problems coping? Are you worried that it may never get better? While many women experience some mild mood change or \"the blues\" during or after the birth of a child, 1 in 9 women experience more significant symptoms of depression or anxiety. 1 in 10 Dads become depressed during the first year. Things you can do  Being a good parent includes taking care of yourself. If you take care of yourself, you will be able to take better care of your baby and your family. · Talk to a counselor or healthcare provider who has training in  mood and anxiety problems. · Learn as much as you can about pregnancy and postpartum depression and anxiety. · Get support from family and friends. Ask for help when you need it. · Join a support group in your area or online. · Keep active by walking, stretching or whatever form of exercise helps you to feel better.   · Get enough rest and time for yourself. · Eat a healthy diet. · Don't give up! It may take more than one try to get the right help you need. These are general instructions for a healthy lifestyle:    No smoking/ No tobacco products/ Avoid exposure to second hand smoke    Surgeon General's Warning:  Quitting smoking now greatly reduces serious risk to your health. Obesity, smoking, and sedentary lifestyle greatly increases your risk for illness    A healthy diet, regular physical exercise & weight monitoring are important for maintaining a healthy lifestyle    Recognize signs and symptoms of STROKE:    F-face looks uneven    A-arms unable to move or move unevenly    S-speech slurred or non-existent    T-time-call 911 as soon as signs and symptoms begin - DO NOT go       back to bed or wait to see if you get better - TIME IS BRAIN. I have had the opportunity to make my options or choices for discharge. I have received and understand these instructions.

## 2019-01-01 NOTE — ROUTINE PROCESS
Verbal shift change report given to NICOLE Auguste RN (oncoming nurse) by Semaj Lopez RN (offgoing nurse). Report included the following information SBAR, MAR and Recent Results.

## 2019-01-01 NOTE — ROUTINE PROCESS
Bedside and Verbal shift change report given to RADHA Ramirez (oncoming nurse) by NICOLE Chiang, RN (offgoing nurse). Report included the following information SBAR, Kardex, Procedure Summary, Intake/Output, MAR and Recent Results. 1400- Pt discharged via wheelchair to discharge lot

## 2019-01-01 NOTE — ROUTINE PROCESS
Bedside shift change report given to RADHA Lozano RN (oncoming nurse) by Talya Salas RN (offgoing nurse). Report included the following information SBAR, Kardex, Intake/Output, MAR and Recent Results.

## 2019-01-01 NOTE — DISCHARGE SUMMARY
Obstetrical Discharge Summary     Name: Oumou Arriola MRN: [id number]  SSN: [id number]    YOB: 1980  Age: 41 y.o. Sex: female      Allergies: Bactrim [sulfamethoprim] and Sulfa (sulfonamide antibiotics)    Admit Date: 2018    Discharge Date: 2019     Admitting Physician: Mariangel Mendosa MD     Attending Physician:  Cayla Charles DO     * Admission Diagnoses: Pregnancy  Amniotic fluid leaking    * Discharge Diagnoses:   Information for the patient's :  Mita Seaman [[id number]]   Delivery of a 3.935 kg female infant via Vaginal, Spontaneous on 2018 at 4:49 PM  by . Apgars were 9 and 9. Additional Diagnoses:   Hospital Problems as of 2019 Never Reviewed          Codes Class Noted - Resolved POA    Amniotic fluid leaking ICD-10-CM: O42.90  ICD-9-CM: 658.10  2018 - Present Unknown             Lab Results   Component Value Date/Time    Rubella, External IMMUNE  2018    GrBStrep, External NEGATIVE 2018    ABO,Rh O POSITIVE  2018      There is no immunization history on file for this patient. * Procedures:  on 18. Girl.    * No surgery found *      [place]  Depression Scale  I have been able to laugh and see the funny side of things: As much as I always could  I have looked forward with enjoyment to things: As much as I ever did  I have blamed myself unnecessarily when things went wrong: Yes, most of the time  I have been anxious or worried for no good reason: Yes, very often  I have felt scared or panicky for no very good reason: No, not much  Things have been getting on top of me: No, most of the time I have coped quite well  I have been so unhappy that I have had difficulty sleeping: Not very often  I have felt sad or miserable: No, not at all  I have been so unhappy that I have been crying: No, never  The thought of harming myself has occurred to me: Never  Total Score: 9    * Discharge Condition: good    * Hospital Course: Normal hospital course following the delivery. * Disposition: Home    Discharge Medications:   Current Discharge Medication List      CONTINUE these medications which have NOT CHANGED    Details   famotidine (PEPCID AC) 10 mg tablet Take 10 mg by mouth two (2) times a day. calcium carbonate (TUMS) 200 mg calcium (500 mg) chew Take 1 Tab by mouth daily. PNV66-Iron Fumarate-FA-DSS-DHA 26-1.2- mg cap Take  by mouth. * Follow-up Care/Patient Instructions:   Activity: no heavy lifting, no sex, douching, tampons, bath/pool x 6 weeks  Diet: Regular Diet  Wound Care: None needed    Follow-up Information     Follow up With Specialties Details Why Contact Info    Almaz Martinez DO Obstetrics & Gynecology, Gynecology, Obstetrics In 6 weeks  [address]  [phone]             Signed By:  Allyson Moore DO     January 1, 2019